# Patient Record
Sex: MALE | Race: WHITE | NOT HISPANIC OR LATINO | Employment: FULL TIME | URBAN - METROPOLITAN AREA
[De-identification: names, ages, dates, MRNs, and addresses within clinical notes are randomized per-mention and may not be internally consistent; named-entity substitution may affect disease eponyms.]

---

## 2017-10-10 ENCOUNTER — OFFICE VISIT (OUTPATIENT)
Dept: URGENT CARE | Facility: PHYSICIAN GROUP | Age: 32
End: 2017-10-10
Payer: COMMERCIAL

## 2017-10-10 VITALS
DIASTOLIC BLOOD PRESSURE: 74 MMHG | HEART RATE: 85 BPM | OXYGEN SATURATION: 95 % | WEIGHT: 163 LBS | RESPIRATION RATE: 16 BRPM | SYSTOLIC BLOOD PRESSURE: 124 MMHG | BODY MASS INDEX: 23.34 KG/M2 | HEIGHT: 70 IN | TEMPERATURE: 99 F

## 2017-10-10 DIAGNOSIS — J01.20 ACUTE NON-RECURRENT ETHMOIDAL SINUSITIS: ICD-10-CM

## 2017-10-10 PROCEDURE — 99203 OFFICE O/P NEW LOW 30 MIN: CPT | Performed by: PHYSICIAN ASSISTANT

## 2017-10-10 RX ORDER — AMOXICILLIN AND CLAVULANATE POTASSIUM 875; 125 MG/1; MG/1
1 TABLET, FILM COATED ORAL 2 TIMES DAILY
Qty: 20 TAB | Refills: 0 | Status: SHIPPED | OUTPATIENT
Start: 2017-10-10 | End: 2017-10-20

## 2017-10-10 ASSESSMENT — ENCOUNTER SYMPTOMS
DIZZINESS: 0
HEADACHES: 1
EYE PAIN: 1
GASTROINTESTINAL NEGATIVE: 1
RESPIRATORY NEGATIVE: 1
MUSCULOSKELETAL NEGATIVE: 1
PSYCHIATRIC NEGATIVE: 1
CARDIOVASCULAR NEGATIVE: 1

## 2017-10-11 NOTE — PROGRESS NOTES
Subjective:      Darrel Parker is a 31 y.o. male who presents with Sinus Problem (C/o sinus pressure, bilateral ear fullness, sore throat, runny nose, swollen lymph nodes x1 wk)            HPI  Chief Complaint   Patient presents with   • Sinus Problem     C/o sinus pressure, bilateral ear fullness, sore throat, runny nose, swollen lymph nodes x1 wk       HPI:  Darrel Parker is a 31 y.o. male who presents with sinus pressure and cough.  Started getting better and then worsened last night.  Pain in the ears.  Ear feel full.  No fever or chills.  Has tried dayquil with minimal improvement.  Wife got him sick.  Yellow and clear.  Mucous has gotten thicker.  Cough occassional.  Patient denies HA, SOB, chest pain, palpitations, fever, chills, or n/v/d.      No past medical history on file.    No past surgical history on file.    History reviewed. No pertinent family history.    Social History     Social History   • Marital status: Single     Spouse name: N/A   • Number of children: N/A   • Years of education: N/A     Occupational History   • Not on file.     Social History Main Topics   • Smoking status: Never Smoker   • Smokeless tobacco: Never Used   • Alcohol use Yes      Comment: 1 beer nightly   • Drug use: No   • Sexual activity: Not on file     Other Topics Concern   • Not on file     Social History Narrative   • No narrative on file         Current Outpatient Prescriptions:   •  Pseudoephedrine-APAP-DM (DAYQUIL PO), Take  by mouth.    Allergies   Allergen Reactions   • Ceclor [Cefaclor]         Review of Systems   Constitutional: Positive for malaise/fatigue.   HENT: Positive for congestion.    Eyes: Positive for pain.   Respiratory: Negative.    Cardiovascular: Negative.    Gastrointestinal: Negative.    Genitourinary: Negative.    Musculoskeletal: Negative.    Skin: Negative.    Neurological: Positive for headaches. Negative for dizziness.   Endo/Heme/Allergies: Negative.    Psychiatric/Behavioral:  "Negative.           Objective:     /74   Pulse 85   Temp 37.2 °C (99 °F)   Resp 16   Ht 1.778 m (5' 10\")   Wt 73.9 kg (163 lb)   SpO2 95%   BMI 23.39 kg/m²      Physical Exam       Physical Exam   Nursing note and vitals reviewed.    Constitutional:   Appropriately groomed, pleasant affect, well nourished, in NAD.    Head:   Normocephalic, atraumatic.    Eyes:   PERRLA, EOM's full, sclera white, conjunctiva not erythematous, and medial canthus without exudate bilaterally.    Ears:  Auricle and tragus non-tender to manipulation.  No pre-auricular lymphadenopathy or mastoid ttp.  EACs with mild cerumen bilaterally, not erythematous.  TM’s pearly gray with cone of light present and umbo and malleolus visible bilaterally.  No bulging or fluid bubbles present in middle ear.  Hearing grossly intact to voice.    Nose:  Nares not patent bilaterally.  Nasal mucosa edematous with yellow-white rhinorrhea bilaterally. Frontal and Maxillary sinuses minimally tender to percussion bilaterally.    Throat:  Dentition wnl, mucosa moist without lesions.  Oropharynx erythematous, with no enlargement of the palatine tonsils bilaterally with no exudates.    Post nasal drainage present.  Soft palate rises symmetrically bilaterally and uvula midline.      Neck: Neck supple, with mild anterior lymphadenopathy that is soft and mobile to palpation. Thyroid non-palpable without tenderness or nodules. No supraclavicular lymphadenopathy.    Lungs:  Respiratory effort not labored without accessory muscle use.  Lungs clear to auscultation bilaterally without wheezes or rales. Rhonchi clear to cough.    Heart:  RRR, without murmurs rubs or gallops.  Radial and dorsalis pedis pulse 2+ bilaterally.  No LE edema.    Musculoskeletal:  Gait non-antalgic with a narrow base.    Derm:  Skin without rashes or lesions with good turgor pressure.      Psychiatric:  Mood, affect, and judgement appropriate.         Assessment/Plan:     1. Acute " non-recurrent ethmoidal sinusitis  amoxicillin-clavulanate (AUGMENTIN) 875-125 MG Tab      Patient presents with one week of URI with improving symptoms and now worsening over the last several days with thick yellow rhinorrhea. Patient states pulsing pain behind the eyes. On exam patient is afebrile but slightly elevated temperature. Minimal maxillary sinus tenderness to percussion. Suspect ethmoid sinusitis recommended nasal saline irrigation and Flonase. Prescribe Augmentin not improving within the next several days.    Patient was in agreement with this treatment plan and seemed to understand without barriers. All questions were encouraged and answered.  Reviewed signs and symptoms of when to seek emergency medical care.     Please note that this dictation was created using voice recognition software.  I have made every reasonable attempt to correct obvious errors, but I expect there are errors of va and possibly content that I did not discover before finalizing the note.

## 2017-10-11 NOTE — PATIENT INSTRUCTIONS
Flonase and nasal saline irrigation (netti pot or [facility] Sinus Rinse).  Used distilled water or boiled tap water with nasal flushes, not straight tap water.  Humidifier at bedtime.  Hot steam showers to loosen up mucous.  Cough medicine at bedtime.  Lots of fluids, tea with honey.  Ibuprofen for headache, fever, chills.  Be sure to take with food.  Salt water gargles.  Return if worsening: Yellow thicker mucus changes, worsening pain around the eyes and radiates to the teeth, and fever over 101°F.

## 2018-04-07 ENCOUNTER — OFFICE VISIT (OUTPATIENT)
Dept: URGENT CARE | Facility: PHYSICIAN GROUP | Age: 33
End: 2018-04-07
Payer: COMMERCIAL
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
## 2018-04-07 DIAGNOSIS — T07.XXXA MULTIPLE LACERATIONS: ICD-10-CM

## 2018-04-07 PROCEDURE — 12001 RPR S/N/AX/GEN/TRNK 2.5CM/<: CPT | Performed by: NURSE PRACTITIONER

## 2018-04-07 PROCEDURE — 99214 OFFICE O/P EST MOD 30 MIN: CPT | Mod: 25 | Performed by: NURSE PRACTITIONER

## 2018-04-07 RX ORDER — AMOXICILLIN AND CLAVULANATE POTASSIUM 875; 125 MG/1; MG/1
1 TABLET, FILM COATED ORAL 2 TIMES DAILY
Qty: 10 TAB | Refills: 0 | Status: SHIPPED | OUTPATIENT
Start: 2018-04-07 | End: 2018-04-13

## 2018-04-07 ASSESSMENT — ENCOUNTER SYMPTOMS
MYALGIAS: 0
FEVER: 0
HEADACHES: 0
SHORTNESS OF BREATH: 0
COUGH: 0
CHILLS: 0

## 2018-04-07 NOTE — PROGRESS NOTES
Subjective:      Darrel Parker is a 32 y.o. male who presents with No chief complaint on file.            Medications, Allergies and Prior Medical Hx reviewed and updated in [facility].with patient/family today     Pt was bit by large dog on right lower leg and right forearm. Tdap is up to date.         Review of Systems   Constitutional: Negative for chills and fever.   HENT: Negative for congestion.    Respiratory: Negative for cough and shortness of breath.    Musculoskeletal: Negative for myalgias.   Skin: Negative for rash.   Neurological: Negative for headaches.          Objective:     There were no vitals taken for this visit.     Physical Exam   Constitutional: He appears well-developed and well-nourished. No distress.   HENT:   Head: Normocephalic and atraumatic.   Eyes: Conjunctivae are normal. Pupils are equal, round, and reactive to light.   Neck: Neck supple.   Cardiovascular: Normal rate.    Pulmonary/Chest: Effort normal. No respiratory distress.   Musculoskeletal:        Right shoulder: He exhibits decreased range of motion, tenderness, swelling, laceration and pain. He exhibits no bony tenderness.        Arms:       Right upper leg: He exhibits tenderness, swelling and laceration. He exhibits no bony tenderness, no edema and no deformity.        Legs:  Neurological: He is alert.   Awake, alert, answering questions appropriately, moving all extremeties   Skin: Skin is warm and dry. No rash noted.   Psychiatric: He has a normal mood and affect. His behavior is normal.               Assessment/Plan:     1. Dog bite, initial encounter  amoxicillin-clavulanate (AUGMENTIN) 875-125 MG Tab         Discussed the risks of dog bites and wound infections. Discussed that we would not be closing. The majority of his lacerations. The wound on his right thigh has a gaping margin is approximately 2 cm in length. I will place one suture to loosely approximate the margins.    Procedure: Laceration Repair  -Risks  including bleeding, nerve damage, infection, and poor cosmetic outcome discussed. Benefits and alternatives discussed.   -Sterile technique throughout  -Local anesthesia with 2% lidocaine with  -1 interrupted sutures of # 4-0 Nylon were used to close the wound  with loose approximation  -Polysporin and dressing placed  -Patient tolerated well    Wound care and infection symptoms discussed with pt,   Return or f/u with your pcp for suture removal in 7 days  Pt will go to the ER or return here for worsening or changing symptoms as discussed,  Discharge instructions discussed with pt/family who verbalize understanding and agreement with poc

## 2018-04-13 ENCOUNTER — OFFICE VISIT (OUTPATIENT)
Dept: URGENT CARE | Facility: PHYSICIAN GROUP | Age: 33
End: 2018-04-13
Payer: COMMERCIAL

## 2018-04-13 VITALS
TEMPERATURE: 98.8 F | DIASTOLIC BLOOD PRESSURE: 80 MMHG | SYSTOLIC BLOOD PRESSURE: 120 MMHG | HEIGHT: 70 IN | WEIGHT: 162 LBS | BODY MASS INDEX: 23.19 KG/M2 | OXYGEN SATURATION: 95 % | HEART RATE: 81 BPM
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
## 2018-04-13 PROCEDURE — 99214 OFFICE O/P EST MOD 30 MIN: CPT | Performed by: PHYSICIAN ASSISTANT

## 2018-04-13 RX ORDER — AMOXICILLIN AND CLAVULANATE POTASSIUM 875; 125 MG/1; MG/1
1 TABLET, FILM COATED ORAL 2 TIMES DAILY
Qty: 14 TAB | Refills: 0 | Status: SHIPPED | OUTPATIENT
Start: 2018-04-13 | End: 2018-04-20

## 2018-04-14 ASSESSMENT — ENCOUNTER SYMPTOMS
ABDOMINAL PAIN: 0
MYALGIAS: 0
NAUSEA: 0
FEVER: 0
CARDIOVASCULAR NEGATIVE: 1
CHILLS: 0
VOMITING: 0
DIARRHEA: 0
RESPIRATORY NEGATIVE: 1

## 2018-04-14 NOTE — PROGRESS NOTES
"Subjective:      Darrel Parker is a 32 y.o. male who presents with Dog Bite (Rt calf and forearm has deep puncture wounds from a dog bite that happened on Saturday)            Animal Bite   This is a new problem. The current episode started in the past 7 days. The problem occurs constantly. The problem has been unchanged. Pertinent negatives include no abdominal pain, chills, fever, myalgias, nausea or vomiting. The symptoms are aggravated by walking and standing. Treatments tried: Augmentin. The treatment provided significant relief.   Was seen 5 days ago and started on Augmentin. Some redness of the wound of the right leg. Wife is concerned because he is finishing antibiotic today and is hoping to extend.    PMH:  has no past medical history on file.  MEDS:   Current Outpatient Prescriptions:   •  amoxicillin-clavulanate (AUGMENTIN) 875-125 MG Tab, Take 1 Tab by mouth 2 times a day for 7 days., Disp: 14 Tab, Rfl: 0  •  Pseudoephedrine-APAP-DM (DAYQUIL PO), Take  by mouth., Disp: , Rfl:   ALLERGIES:   Allergies   Allergen Reactions   • Ceclor [Cefaclor]      SURGHX: History reviewed. No pertinent surgical history.  SOCHX:  reports that he has never smoked. He has never used smokeless tobacco. He reports that he drinks alcohol. He reports that he does not use drugs.  FH: family history is not on file.      Review of Systems   Constitutional: Negative for chills and fever.   HENT: Negative.    Respiratory: Negative.    Cardiovascular: Negative.    Gastrointestinal: Negative for abdominal pain, diarrhea, nausea and vomiting.   Musculoskeletal: Negative for myalgias.   Skin:        Dog bite right forearm and right lower leg       Medications, Allergies, and current problem list reviewed today in Epic     Objective:     /80   Pulse 81   Temp 37.1 °C (98.8 °F)   Ht 1.778 m (5' 10\")   Wt 73.5 kg (162 lb)   SpO2 95%   BMI 23.24 kg/m²      Physical Exam   Constitutional: He is oriented to person, place, " and time. He appears well-developed and well-nourished. No distress.   HENT:   Head: Normocephalic and atraumatic.   Eyes: Conjunctivae are normal.   Neck: Normal range of motion. Neck supple.   Cardiovascular: Normal rate, regular rhythm and normal heart sounds.    No murmur heard.  Pulmonary/Chest: Effort normal and breath sounds normal. No respiratory distress. He has no wheezes.   Neurological: He is alert and oriented to person, place, and time.   Skin: Skin is warm and dry. He is not diaphoretic.        Laceration lateral right lower leg. One loose interrupted suture in place. Some surrounding erythema and tenderness. No fluctuance, discharge. No red streaking. Range of motion turning joints within normal limits.  Superficial lack to the right wrist. No surrounding erythema, tenderness, discharge.   Psychiatric: He has a normal mood and affect. His behavior is normal. Judgment and thought content normal.   Nursing note and vitals reviewed.              Assessment/Plan:     1. Dog bite, subsequent encounter  amoxicillin-clavulanate (AUGMENTIN) 875-125 MG Tab     Well-healing dog bite. Given that the wound on the right leg is deep and has not been healing fast I will extend his Augmentin. Vital signs and exam normal today. No signs of worsening infection.  Wound care discussed  OTC meds and conservative measures as discussed  Return to clinic or go to ED if symptoms worsen or persist. Indications for ED discussed at length. Patient voices understanding. Follow-up with your primary care provider in 3-5 days. Red flags discussed. All side effects of medication discussed including allergic response, GI upset, tendon injury, etc.    Please note that this dictation was created using voice recognition software. I have made every reasonable attempt to correct obvious errors, but I expect that there are errors of grammar and possibly content that I did not discover before finalizing the note.

## 2021-05-12 ENCOUNTER — OFFICE VISIT (OUTPATIENT)
Dept: MEDICAL GROUP | Facility: PHYSICIAN GROUP | Age: 36
End: 2021-05-12
Payer: COMMERCIAL

## 2021-05-12 VITALS
HEART RATE: 60 BPM | SYSTOLIC BLOOD PRESSURE: 116 MMHG | BODY MASS INDEX: 21.33 KG/M2 | HEIGHT: 70 IN | OXYGEN SATURATION: 98 % | TEMPERATURE: 98 F | WEIGHT: 149 LBS | DIASTOLIC BLOOD PRESSURE: 68 MMHG

## 2021-05-12 DIAGNOSIS — Z00.00 ANNUAL PHYSICAL EXAM: ICD-10-CM

## 2021-05-12 DIAGNOSIS — R21 RASH AND NONSPECIFIC SKIN ERUPTION: ICD-10-CM

## 2021-05-12 PROCEDURE — 99395 PREV VISIT EST AGE 18-39: CPT | Performed by: FAMILY MEDICINE

## 2021-05-12 ASSESSMENT — PATIENT HEALTH QUESTIONNAIRE - PHQ9: CLINICAL INTERPRETATION OF PHQ2 SCORE: 0

## 2021-05-12 NOTE — PROGRESS NOTES
"CC: Annual physical    HISTORY OF THE PRESENT ILLNESS: Patient is a 35 y.o. male.     Patient is here to establish care today.  He has no specific concerns aside from a minor intermittent rash on his torso area, it is not really currently present.    He has no past medical history or past surgical history.  He would like to get labs completed today.    Allergies: Ceclor [cefaclor]    No current Epic-ordered outpatient medications on file.     No current Epic-ordered facility-administered medications on file.       History reviewed. No pertinent past medical history.    History reviewed. No pertinent surgical history.    Social History     Tobacco Use   • Smoking status: Never Smoker   • Smokeless tobacco: Never Used   Vaping Use   • Vaping Use: Never used   Substance Use Topics   • Alcohol use: Yes     Comment: 1 beer/week if that   • Drug use: No       Social History     Social History Narrative   • Not on file       Family History   Problem Relation Age of Onset   • Cancer Mother         glioblastoma   • Other Father         gallbladder infection   • Other Brother         cerebral palsy       ROS:   Denies fever, chest pain, shortness of breath.  Denies trouble with bowels or bladder.      Exam: /68 (BP Location: Right arm, Patient Position: Sitting, BP Cuff Size: Adult)   Pulse 60   Temp 36.7 °C (98 °F) (Temporal)   Ht 1.778 m (5' 10\")   Wt 67.6 kg (149 lb)   SpO2 98%  Body mass index is 21.38 kg/m².    General: Well appearing, NAD  HEENT: Normocephalic. Conjunctiva clear, lids without ptosis, pupils equal and reactive to light accommodation, ears normal shape and contour, canals are clear bilaterally, tympanic membranes without bulging or erythema and normal cone of light  Neck: Supple without JVD. No thyromegaly or nodules  Pulmonary: Clear to ausculation.  Normal effort. No rales, ronchi, or wheezing.  Cardiovascular: Regular rate and rhythm without murmur, rubs or gallop.   Abdomen: Soft, " nontender, nondistended. Normal bowel sounds. Liver and spleen are not palpable. No rebound or guarding  Neurologic: normal gait  Lymph: No cervical, supraclavicular lymph nodes are palpable  Skin: Warm and dry.  No obvious lesions.  Musculoskeletal:  No extremity cyanosis, clubbing, or edema.  Psych: Normal mood and affect. Alert and oriented. Judgment and insight is normal.    Please note that this dictation was created using voice recognition software. I have made every reasonable attempt to correct obvious errors, but I expect that there are errors of grammar and possibly content that I did not discover before finalizing the note.      Assessment/Plan  Darrel was seen today for establish care and requesting labs.    Diagnoses and all orders for this visit:    Annual physical exam  Patient here for annual physical exam today.  No concerns on review of systems or exam.  He is up-to-date on Covid vaccine.  Preventative lab work ordered as below.  -     CBC WITH DIFFERENTIAL; Future  -     Comp Metabolic Panel; Future  -     Lipid Profile; Future  -     VITAMIN D,25 HYDROXY; Future  -     TSH WITH REFLEX TO FT4; Future    Rash and nonspecific skin eruption  Rash is not really present today.  He is using Cetaphil cream which does seem to help.  Recommend trialing over-the-counter hydrocortisone cream if persisting.    Follow-up in 1 year or sooner if needed.    Luz Ferraro, DO  [facility]

## 2022-10-10 ENCOUNTER — OFFICE VISIT (OUTPATIENT)
Dept: URGENT CARE | Facility: PHYSICIAN GROUP | Age: 37
End: 2022-10-10
Payer: COMMERCIAL

## 2022-10-10 VITALS
HEIGHT: 70 IN | BODY MASS INDEX: 21.93 KG/M2 | OXYGEN SATURATION: 98 % | DIASTOLIC BLOOD PRESSURE: 80 MMHG | WEIGHT: 153.2 LBS | RESPIRATION RATE: 16 BRPM | TEMPERATURE: 98.5 F | SYSTOLIC BLOOD PRESSURE: 122 MMHG | HEART RATE: 72 BPM

## 2022-10-10 DIAGNOSIS — S91.331A PUNCTURE WOUND OF RIGHT FOOT, INITIAL ENCOUNTER: ICD-10-CM

## 2022-10-10 PROCEDURE — 90471 IMMUNIZATION ADMIN: CPT | Performed by: FAMILY MEDICINE

## 2022-10-10 PROCEDURE — 90715 TDAP VACCINE 7 YRS/> IM: CPT | Performed by: FAMILY MEDICINE

## 2022-10-10 PROCEDURE — 99213 OFFICE O/P EST LOW 20 MIN: CPT | Mod: 25 | Performed by: FAMILY MEDICINE

## 2022-10-10 NOTE — PROGRESS NOTES
"Subjective:      Chief Complaint   Patient presents with    Foot Injury     R foot injury, pt was working on house when he accidentally stepped on nail, onset yesterday 3pm                 puncture wound   The incident occurred yesterday - he stepped on a nail at home with rt heel.       tetanus status is: not current        Social History     Tobacco Use    Smoking status: Never    Smokeless tobacco: Never   Vaping Use    Vaping Use: Never used   Substance Use Topics    Alcohol use: Yes     Comment: 1 beer/week if that    Drug use: No           History reviewed. No pertinent past medical history.      No current outpatient medications on file prior to visit.     No current facility-administered medications on file prior to visit.         Review of Systems   Cardio - denies chest pain  resp - denies SOB.   Neurological: Negative for tingling, sensory change and focal weakness.   All other systems reviewed and are negative.         Objective:     /80 (BP Location: Right arm, Patient Position: Sitting, BP Cuff Size: Adult)   Pulse 72   Temp 36.9 °C (98.5 °F) (Temporal)   Resp 16   Ht 1.778 m (5' 10\")   Wt 69.5 kg (153 lb 3.2 oz)   SpO2 98%       Physical Exam   Constitutional: pt is oriented to person, place, and time. Pt appears well-developed. No distress.   HENT:   Head: Normocephalic and atraumatic.   Eyes: Conjunctivae are normal.   Cardiovascular: Normal rate.    Pulmonary/Chest: Effort normal.   Musculoskeletal:    rt foot - there is a closed, pinpoint, barely visible puncture wound over heel .  There is no erythema, no d/c, no swelling  normal range of motion and normal capillary refill. Normal sensation noted. Normal strength noted.           Neurological: He is alert and oriented to person, place, and time.   Skin: Skin is warm. Pt is not diaphoretic. No erythema.   Psychiatric:  behavior is normal.   Nursing note and vitals reviewed.              Assessment/Plan:           1. Puncture wound of " right foot, initial encounter   Offered abx prophalyxis - pt declined    No signs of wound infection at this time.       - Tdap =>8yo IM    Pt advised to RTC immediately for any inc pain, redness, wound d/c or fever